# Patient Record
Sex: FEMALE | Race: OTHER | HISPANIC OR LATINO
[De-identification: names, ages, dates, MRNs, and addresses within clinical notes are randomized per-mention and may not be internally consistent; named-entity substitution may affect disease eponyms.]

---

## 2017-01-04 ENCOUNTER — RESULT REVIEW (OUTPATIENT)
Age: 47
End: 2017-01-04

## 2017-03-24 ENCOUNTER — APPOINTMENT (OUTPATIENT)
Dept: OTOLARYNGOLOGY | Facility: CLINIC | Age: 47
End: 2017-03-24

## 2017-03-24 VITALS
HEIGHT: 55 IN | DIASTOLIC BLOOD PRESSURE: 78 MMHG | HEART RATE: 96 BPM | BODY MASS INDEX: 34.71 KG/M2 | WEIGHT: 150 LBS | SYSTOLIC BLOOD PRESSURE: 119 MMHG

## 2017-03-24 RX ORDER — LEVOTHYROXINE SODIUM 0.1 MG/1
100 TABLET ORAL DAILY
Qty: 30 | Refills: 3 | Status: ACTIVE | COMMUNITY
Start: 2017-03-24 | End: 1900-01-01

## 2017-06-14 ENCOUNTER — OUTPATIENT (OUTPATIENT)
Dept: OUTPATIENT SERVICES | Facility: HOSPITAL | Age: 47
LOS: 1 days | End: 2017-06-14
Payer: COMMERCIAL

## 2017-06-14 VITALS
DIASTOLIC BLOOD PRESSURE: 70 MMHG | HEIGHT: 59 IN | WEIGHT: 156.09 LBS | TEMPERATURE: 98 F | HEART RATE: 96 BPM | RESPIRATION RATE: 16 BRPM | SYSTOLIC BLOOD PRESSURE: 110 MMHG

## 2017-06-14 DIAGNOSIS — Z98.890 OTHER SPECIFIED POSTPROCEDURAL STATES: Chronic | ICD-10-CM

## 2017-06-14 DIAGNOSIS — E72.3 DISORDERS OF LYSINE AND HYDROXYLYSINE METABOLISM: ICD-10-CM

## 2017-06-14 DIAGNOSIS — E03.9 HYPOTHYROIDISM, UNSPECIFIED: ICD-10-CM

## 2017-06-14 DIAGNOSIS — E11.9 TYPE 2 DIABETES MELLITUS WITHOUT COMPLICATIONS: ICD-10-CM

## 2017-06-14 DIAGNOSIS — Z98.891 HISTORY OF UTERINE SCAR FROM PREVIOUS SURGERY: Chronic | ICD-10-CM

## 2017-06-14 LAB
BUN SERPL-MCNC: 13 MG/DL — SIGNIFICANT CHANGE UP (ref 7–23)
CALCIUM SERPL-MCNC: 9.1 MG/DL — SIGNIFICANT CHANGE UP (ref 8.4–10.5)
CHLORIDE SERPL-SCNC: 100 MMOL/L — SIGNIFICANT CHANGE UP (ref 98–107)
CO2 SERPL-SCNC: 25 MMOL/L — SIGNIFICANT CHANGE UP (ref 22–31)
CREAT SERPL-MCNC: 0.57 MG/DL — SIGNIFICANT CHANGE UP (ref 0.5–1.3)
GLUCOSE SERPL-MCNC: 117 MG/DL — HIGH (ref 70–99)
HBA1C BLD-MCNC: 6.9 % — HIGH (ref 4–5.6)
HCT VFR BLD CALC: 38.9 % — SIGNIFICANT CHANGE UP (ref 34.5–45)
HGB BLD-MCNC: 11.4 G/DL — LOW (ref 11.5–15.5)
MCHC RBC-ENTMCNC: 23.4 PG — LOW (ref 27–34)
MCHC RBC-ENTMCNC: 29.3 % — LOW (ref 32–36)
MCV RBC AUTO: 79.9 FL — LOW (ref 80–100)
PLATELET # BLD AUTO: 303 K/UL — SIGNIFICANT CHANGE UP (ref 150–400)
PMV BLD: 9.4 FL — SIGNIFICANT CHANGE UP (ref 7–13)
POTASSIUM SERPL-MCNC: 4.2 MMOL/L — SIGNIFICANT CHANGE UP (ref 3.5–5.3)
POTASSIUM SERPL-SCNC: 4.2 MMOL/L — SIGNIFICANT CHANGE UP (ref 3.5–5.3)
RBC # BLD: 4.87 M/UL — SIGNIFICANT CHANGE UP (ref 3.8–5.2)
RBC # FLD: 14.8 % — HIGH (ref 10.3–14.5)
SODIUM SERPL-SCNC: 139 MMOL/L — SIGNIFICANT CHANGE UP (ref 135–145)
WBC # BLD: 8.51 K/UL — SIGNIFICANT CHANGE UP (ref 3.8–10.5)
WBC # FLD AUTO: 8.51 K/UL — SIGNIFICANT CHANGE UP (ref 3.8–10.5)

## 2017-06-14 PROCEDURE — 93010 ELECTROCARDIOGRAM REPORT: CPT

## 2017-06-14 RX ORDER — ASPIRIN/CALCIUM CARB/MAGNESIUM 324 MG
1 TABLET ORAL
Qty: 0 | Refills: 0 | COMMUNITY

## 2017-06-14 NOTE — H&P PST ADULT - RS GEN PE MLT RESP DETAILS PC
clear to auscultation bilaterally/airway patent/breath sounds equal/respirations non-labored/good air movement

## 2017-06-14 NOTE — H&P PST ADULT - ASSESSMENT
47 y/o female with pmhx hypothyroidism, HLD, DMT2, presents to PST for scheduled left excision of sebaceous cyst on 6/28/17. Patient reports having a mass to left neck that has grown over the last five years. Previous sx done to remove access tissue in [facility] however it has returned.

## 2017-06-14 NOTE — H&P PST ADULT - PROBLEM SELECTOR PLAN 1
Patient scheduled for left excision of sebaceous cyst on 6/28/17.   preop instructions given and explained, both step daughter and patient verbalized understanding.  urine container given for the day of surgery   medical clearance completed awaiting reports

## 2017-06-14 NOTE — H&P PST ADULT - MUSCULOSKELETAL
details… detailed exam no joint swelling/no joint erythema/no joint warmth/normal strength/ROM intact

## 2017-06-14 NOTE — H&P PST ADULT - NSANTHOSAYNRD_GEN_A_CORE
test completed 6/12/17 awaiting results/No. DEON screening performed.  STOP BANG Legend: 0-2 = LOW Risk; 3-4 = INTERMEDIATE Risk; 5-8 = HIGH Risk

## 2017-06-14 NOTE — H&P PST ADULT - HISTORY OF PRESENT ILLNESS
45 y/o female with pmhx hypothyroidism, HLD, DMT2, presents to PST for scheduled left excision of sebaceous cyst on 6/28/17. Patient reports having a mass to left neck that has grown over the last five years. Previous sx done to remove access tissue in [facility] however it has returned. Denies pain.

## 2017-06-14 NOTE — H&P PST ADULT - LYMPHATIC
posterior cervical R/supraclavicular L/anterior cervical R/supraclavicular R/posterior cervical L/anterior cervical L

## 2017-06-28 ENCOUNTER — OUTPATIENT (OUTPATIENT)
Dept: OUTPATIENT SERVICES | Facility: HOSPITAL | Age: 47
LOS: 1 days | Discharge: HOME | End: 2017-06-28
Payer: COMMERCIAL

## 2017-06-28 ENCOUNTER — APPOINTMENT (OUTPATIENT)
Dept: OTOLARYNGOLOGY | Facility: HOSPITAL | Age: 47
End: 2017-06-28

## 2017-06-28 ENCOUNTER — RESULT REVIEW (OUTPATIENT)
Age: 47
End: 2017-06-28

## 2017-06-28 VITALS
RESPIRATION RATE: 14 BRPM | HEIGHT: 59 IN | TEMPERATURE: 98 F | OXYGEN SATURATION: 98 % | WEIGHT: 156.09 LBS | DIASTOLIC BLOOD PRESSURE: 65 MMHG | SYSTOLIC BLOOD PRESSURE: 108 MMHG | HEART RATE: 85 BPM

## 2017-06-28 VITALS
OXYGEN SATURATION: 97 % | SYSTOLIC BLOOD PRESSURE: 117 MMHG | DIASTOLIC BLOOD PRESSURE: 72 MMHG | TEMPERATURE: 98 F | RESPIRATION RATE: 15 BRPM | HEART RATE: 89 BPM

## 2017-06-28 DIAGNOSIS — E72.3 DISORDERS OF LYSINE AND HYDROXYLYSINE METABOLISM: ICD-10-CM

## 2017-06-28 DIAGNOSIS — Z98.891 HISTORY OF UTERINE SCAR FROM PREVIOUS SURGERY: Chronic | ICD-10-CM

## 2017-06-28 DIAGNOSIS — Z98.890 OTHER SPECIFIED POSTPROCEDURAL STATES: Chronic | ICD-10-CM

## 2017-06-28 PROCEDURE — 21552 EXC NECK LES SC 3 CM/>: CPT | Mod: LT,GC

## 2017-06-28 PROCEDURE — 88304 TISSUE EXAM BY PATHOLOGIST: CPT | Mod: 26

## 2017-06-28 RX ORDER — SODIUM CHLORIDE 9 MG/ML
1000 INJECTION, SOLUTION INTRAVENOUS
Qty: 0 | Refills: 0 | Status: DISCONTINUED | OUTPATIENT
Start: 2017-06-28 | End: 2017-06-29

## 2017-06-28 RX ORDER — ASPIRIN/CALCIUM CARB/MAGNESIUM 324 MG
1 TABLET ORAL
Qty: 0 | Refills: 0 | COMMUNITY

## 2017-06-28 RX ORDER — ATORVASTATIN CALCIUM 80 MG/1
1 TABLET, FILM COATED ORAL
Qty: 0 | Refills: 0 | COMMUNITY

## 2017-06-28 RX ORDER — METFORMIN HYDROCHLORIDE 850 MG/1
1 TABLET ORAL
Qty: 0 | Refills: 0 | COMMUNITY

## 2017-06-28 RX ORDER — LEVOTHYROXINE SODIUM 125 MCG
1 TABLET ORAL
Qty: 0 | Refills: 0 | COMMUNITY

## 2017-06-28 RX ORDER — OXYCODONE HYDROCHLORIDE 5 MG/1
1 TABLET ORAL
Qty: 20 | Refills: 0 | OUTPATIENT
Start: 2017-06-28

## 2017-06-28 RX ORDER — FOLIC ACID 0.8 MG
1 TABLET ORAL
Qty: 0 | Refills: 0 | COMMUNITY

## 2017-06-28 NOTE — ASU DISCHARGE PLAN (ADULT/PEDIATRIC). - MEDICATION SUMMARY - MEDICATIONS TO TAKE
I will START or STAY ON the medications listed below when I get home from the hospital:    acetaminophen-oxycodone 325 mg-5 mg oral tablet  -- 1 tab(s) by mouth every 4 hours, As needed, Mild Pain (1 - 3) MDD:6  -- Indication: For Disorder of lysine and hydroxylysine metabolism    Aspirin Low Dose 81 mg oral delayed release tablet  -- 1 tab(s) by mouth once a day (at bedtime) - last dose - 6/21/2017  -- Indication: For Disorder of lysine and hydroxylysine metabolism    metFORMIN 1000 mg oral tablet  -- 1 tab(s) by mouth 2 times a day  -- Indication: For Disorder of lysine and hydroxylysine metabolism    atorvastatin 10 mg oral tablet  -- 1 tab(s) by mouth once a day (at bedtime)  -- Indication: For Disorder of lysine and hydroxylysine metabolism    levothyroxine 100 mcg (0.1 mg) oral tablet  -- 1 tab(s) by mouth once a day in morning  -- Indication: For Disorder of lysine and hydroxylysine metabolism    folic acid 1 mg oral tablet  -- 1 tab(s) by mouth once a day (at bedtime)  -- Indication: For Disorder of lysine and hydroxylysine metabolism

## 2017-06-28 NOTE — ASU DISCHARGE PLAN (ADULT/PEDIATRIC). - NURSING INSTRUCTIONS
You were given 1000mg IV Tylenol for pain management.  Please DO NOT take any Tylenol containing products, such as  Vicodin, Percocet, Excedrin, many cold preparations for the next 8 hours (until 845p ).  DO NOT EXCEED 3000MG OF TYLENOL OVER 24 HOURS.

## 2017-06-28 NOTE — ASU DISCHARGE PLAN (ADULT/PEDIATRIC). - NOTIFY
Swelling that continues/Bleeding that does not stop Fever greater than 101/Swelling that continues/Pain not relieved by Medications/Bleeding that does not stop

## 2017-06-29 ENCOUNTER — TRANSCRIPTION ENCOUNTER (OUTPATIENT)
Age: 47
End: 2017-06-29

## 2017-07-03 LAB — SURGICAL PATHOLOGY STUDY: SIGNIFICANT CHANGE UP

## 2017-07-06 ENCOUNTER — APPOINTMENT (OUTPATIENT)
Dept: OTOLARYNGOLOGY | Facility: CLINIC | Age: 47
End: 2017-07-06

## 2017-07-06 DIAGNOSIS — L72.0 EPIDERMAL CYST: ICD-10-CM

## 2017-07-11 ENCOUNTER — TRANSCRIPTION ENCOUNTER (OUTPATIENT)
Age: 47
End: 2017-07-11

## 2017-09-05 ENCOUNTER — APPOINTMENT (OUTPATIENT)
Dept: OTOLARYNGOLOGY | Facility: CLINIC | Age: 47
End: 2017-09-05

## 2020-03-24 ENCOUNTER — EMERGENCY (EMERGENCY)
Facility: HOSPITAL | Age: 50
LOS: 0 days | Discharge: HOME | End: 2020-03-24
Payer: COMMERCIAL

## 2020-03-24 VITALS
WEIGHT: 149.03 LBS | DIASTOLIC BLOOD PRESSURE: 81 MMHG | TEMPERATURE: 101 F | HEIGHT: 64 IN | SYSTOLIC BLOOD PRESSURE: 117 MMHG | RESPIRATION RATE: 20 BRPM | HEART RATE: 107 BPM

## 2020-03-24 DIAGNOSIS — Z98.890 OTHER SPECIFIED POSTPROCEDURAL STATES: Chronic | ICD-10-CM

## 2020-03-24 DIAGNOSIS — Z98.891 HISTORY OF UTERINE SCAR FROM PREVIOUS SURGERY: Chronic | ICD-10-CM

## 2020-03-24 PROCEDURE — 99283 EMERGENCY DEPT VISIT LOW MDM: CPT

## 2020-03-24 RX ORDER — ACETAMINOPHEN 500 MG
975 TABLET ORAL ONCE
Refills: 0 | Status: COMPLETED | OUTPATIENT
Start: 2020-03-24 | End: 2020-03-24

## 2020-03-24 RX ADMIN — Medication 975 MILLIGRAM(S): at 17:12

## 2020-03-24 NOTE — ED PROVIDER NOTE - PATIENT PORTAL LINK FT
You can access the FollowMyHealth Patient Portal offered by [facility] by registering at the following website: [email or web address]. By joining [facility]’s FollowMyHealth portal, you will also be able to view your health information using other applications (apps) compatible with our system.

## 2020-03-24 NOTE — ED PROVIDER NOTE - OBJECTIVE STATEMENT
Pt is a 49 year old female with PMH HTN presents to ED c/o fever. Pt states for past (4) days has had fever (tmax 102.5) treating with tylenol, cough (non productive), HA, sore throat. Pt attests to multiple sick contacts at work, finding out that there were COVID pos contacts. Pt denies any chest pain, sob, abdominal pain, NVCD.

## 2020-03-24 NOTE — ED PROVIDER NOTE - NSFOLLOWUPINSTRUCTIONS_ED_ALL_ED_FT
Follow up with your primary medical doctor, as well as social isolate yourself for 14 days, secondary to likely having COVID    Novel Coronavirus (COVID-19)  The Facts  What is a coronavirus?  Coronaviruses are a large family of viruses that cause illnesses ranging from the common cold  to more severe diseases such as Middle East Respiratory Syndrome (MERS) and Severe Acute  Respiratory Syndrome (SARS).  What is Novel Coronavirus (COVID-19)?  COVID-19 is a new strain of Coronavirus that has not been previously identified in humans. COVID-19  was identified in [place], [place], [place] in December 2019 (COVID-19). COVID-19 has  since been identified outside of [place], in a growing number of countries internationally, including  the [place].  Where can I find the most recent information about COVID-19?  The Centers for Disease Control and Prevention (CDC) is closely monitoring the outbreak caused by the  COVID-19. For the latest information about COVID- 19, visit the CDC website at  https://www.cdc.gov/coronavirus/index.html  How are coronaviruses spread?  Coronaviruses can be transmitted from person-to- person, usually after close contact with an infected person,  for example, in a household, workplace, or healthcare setting via droplets that become airborne after a cough  or sneeze by an affected person. These droplets can then infect a nearby person. It is likely transmission also  occurs by touching recently contaminated surfaces.  What are the symptoms of coronavirus infection?  It depends on the virus, but common signs include fever and/or respiratory symptoms such as  cough and shortness of breath. In more severe cases, infection can cause pneumonia, severe acute  respiratory syndrome, kidney failure and even death. Fortunately, most cases of COVID-19 have an  illness no different than the influenza “flu”. With a majority of these patients having mild symptoms  and overall mortality which appears to be not much different than the flu.  Is there a treatment for a COVID-19?  There is no specific treatment for disease caused by COVID-19. However, many of the symptoms can  be treated based on the patient’s clinical condition. Supportive care for infected persons can be highly  effective.  What can I do to protect myself?  Washing your hands, covering your cough, and disinfecting surfaces are the best precautionary  measures. It is also advisable to avoid close contact with anyone showing symptoms of respiratory  illness such as coughing and sneezing. Those with symptoms should wear a surgical mask when  around others.  What can I do to protect those around me?  If you have been identified as someone who may be infected with COVID-19, we recommend you  follow the self-isolation procedures outlined below to protect those around you and limit the spread  of this virus.   March 3, 2020  Recommendations for Patients Advised to Self-Isolate  for Possible COVID-19 Exposure  We recommend the below precautionary steps from now until 14 days from when you  returned from your travel or date of your last known possible contact:  - Do not go to work, school, or public areas. Avoid using public transportation, ride-sharing, or  taxis.  - As much as possible, separate yourself from other people in your home. If you can, you should  stay in a room and away from other people in your home. Also, you should use a separate  bathroom, if available.  - Wear the supplied mask whenever you are around other people.  - If you have a non-urgent medical appointment, please reschedule for a later date. If the  appointment is urgent, please call the healthcare provider and tell them that you are on selfisolation for possible COVID-19. This will help the healthcare provider’s office take steps to keep  other people from getting infected or exposed. If you can reschedule routine appointments, do  so.  - Wash your hands often with soap and water for at least 15 to 20 seconds or clean your hands  with an alcohol-based hand  that contains 60 to 95% alcohol, covering all surfaces of  your hands and rubbing them together until they feel dry. Soap and water should be used  preferentially if hands are visibly dirty.  - Cover your mouth and nose with a tissue when you cough or sneeze. Throw used tissues in a  lined trash can; immediately wash your hands.  - Avoid touching your eyes, nose, and mouth with your hands.  - Avoid sharing personal household items. You should not share dishes, drinking glasses, cups,  eating utensils, towels, or bedding with other people or pets in your home. After using these  items, they should be washed thoroughly with soap and water.  - Clean and disinfect all “high-touch” surfaces every day. High touch surfaces include counters,  tabletops, doorknobs, light switches, remote controls, bathroom fixtures, toilets, phones,  keyboards, tablets, and bedside tables. Also, clean any surfaces that may have blood, stool, or  body fluids on them.       If you develop worsening symptoms:  - If you develop worsening symptoms, such as severe shortness of breath, please call [phone] option [phone]. They will assist you in determining your next steps.  During your time on self-isolation do the following:  - Work from home if you are able to so.  - Limit social isolation by talking with friends and family on the phone or with face-time  - Talk with friends and relatives who don’t live with you about supporting each other if one  household has to be quarantined. For example, agree to drop groceries or other supplies at the  front door.  - Exercise and spend time outdoors away from others if able to do so.    Why didn’t I get tested for novel coronavirus (COVID-19)?  The number of available tests is very limited so strict rules exist for who is allowed to be tested.  [facility] has been authorized to perform testing and is currently working hard to be  able to start providing the test. Such testing is currently reserved for patients who have had  contact with someone infected with the virus, or those who are very sick a plus those who have  traveled to areas identified by the Centers for Disease Control and Prevention (CD) and will  require hospitalization.  What should I do now?  If you are well enough to be discharged home and are not in a high risk group to have  contracted the COVID-10, you should care for yourself at home exactly like you would if you  have Influenza “flu”. Follow all the standard guidelines about washing your hands, covering  your cough, etc.  You should return to the Emergency Department if you develop worse symptoms, trouble  breathing, chest pain, and/or a fever that doesn’t improve with over the counter  acetaminophen or ibuprofen.

## 2020-03-24 NOTE — ED PROVIDER NOTE - CLINICAL SUMMARY MEDICAL DECISION MAKING FREE TEXT BOX
49 female presents with 4d of fever (T max 102), chills, bodyaches, HA and non productive cough. 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 Physical exam reveals no abnormalities. Pt advised to continue with motrin, tylenol and supportive care and isolate for 14 days

## 2020-03-24 NOTE — ED PROVIDER NOTE - NS ED ROS FT
Constitutional: (+) fever (see HPI)  Eyes/ENT: (-) blurry vision, (-) epistaxis  Cardiovascular: (-) chest pain, (-) syncope  Respiratory: (+) cough, (-) shortness of breath (See HPI)  Gastrointestinal: (-) vomiting, (-) diarrhea  Musculoskeletal: (-) neck pain, (-) back pain, (-) joint pain  Integumentary: (-) rash, (-) edema  Neurological: (+) headache, (-) altered mental status (see HPI)  Psychiatric: (-) hallucinations  Allergic/Immunologic: (-) pruritus

## 2020-03-25 DIAGNOSIS — R51 HEADACHE: ICD-10-CM

## 2020-03-25 DIAGNOSIS — R50.9 FEVER, UNSPECIFIED: ICD-10-CM

## 2020-03-25 DIAGNOSIS — R05 COUGH: ICD-10-CM

## 2020-03-25 DIAGNOSIS — B34.9 VIRAL INFECTION, UNSPECIFIED: ICD-10-CM

## 2020-03-25 PROBLEM — E78.5 HYPERLIPIDEMIA, UNSPECIFIED: Chronic | Status: ACTIVE | Noted: 2017-06-14

## 2020-03-25 PROBLEM — E11.9 TYPE 2 DIABETES MELLITUS WITHOUT COMPLICATIONS: Chronic | Status: ACTIVE | Noted: 2017-06-14

## 2020-03-25 PROBLEM — E72.3: Chronic | Status: ACTIVE | Noted: 2017-06-14

## 2020-03-25 PROBLEM — E03.9 HYPOTHYROIDISM, UNSPECIFIED: Chronic | Status: ACTIVE | Noted: 2017-06-14

## 2021-03-29 NOTE — ED PROVIDER NOTE - NSTIMEPROVIDERCAREINITIATE_GEN_ER
Called patient's daughter-in-law Cherokee Staff to give her an update on patient's status  I was able to connect with Dilcia Mario, updated her and answered her questions  Meghna expressed understanding and gratitude for the call  24-Mar-2020 16:34

## 2021-06-13 NOTE — ED PROVIDER NOTE - MDM PATIENT STATEMENT FOR ADDL TREATMENT
Patients heparin is running at 15 units/kg/hr.   Patient with one or more new problems requiring additional work-up/treatment.

## 2024-08-21 NOTE — H&P PST ADULT - INTERPRETATION SERVICES DECLINED
Upper GI Endoscopy: Before Your Procedure    What is an upper GI endoscopy? An upper gastrointestinal (or GI) endoscopy is a test that allows your doctor to look at the inside of your esophagus, stomach, and the first part of your small intestine, called the duodenum. The esophagus is the tube that carries food to your stomach. The doctor uses a thin, lighted tube that bends. It is called an endoscope, or scope. The doctor puts the tip of the scope in your mouth and gently moves it down your throat. The scope is a flexible video camera. The doctor looks at a monitor (like a TV set or a computer screen) as he or she moves the scope. A doctor may do this procedure to look for ulcers, tumors, infection, or bleeding. It also can be used to look for signs of acid backing up into your esophagus. This is called gastroesophageal reflux disease, or GERD. The doctor can use the scope to take a sample of tissue for study (a biopsy). The doctor also can use the scope to take out growths or stop bleeding. How do you prepare for the procedure? Procedures can be stressful. This information will help you understand what you can expect. And it will help you safely prepare for your procedure. Preparing for the procedure    Do not eat or drink anything for 6 to 8 hours before the test. An empty stomach helps your doctor see your stomach clearly during the test. It also reduces your chances of vomiting. If you vomit, there is a small risk that the vomit could enter your lungs. (This is called aspiration.) If the test is done in an emergency, a tube may be inserted through your nose or mouth to empty your stomach. Do not take sucralfate (Carafate) or antacids on the day of the test. These medicines can make it hard for your doctor to see your upper GI tract. If your doctor tells you to, stop taking iron supplements 7 to 14 days before the test.     Be sure you have someone to take you home.  Anesthesia and pain medicine will
Vss, tan po fluids, denies pain, ambulates easily. IV removed, catheter intact. Discharge instructions provided and states understanding. States ready to go home.  Discharged from facility with family per wheelchair.   
Patient/Caregiver requests family/friend to interpret./Step daughter aure

## 2024-11-14 NOTE — H&P PST ADULT - GENITOURINARY
DATE OF SERVICE: 11-14-24 @ 13:27    Patient is a 72y old  Female who presents with a chief complaint of Symptomatic anemia, Syncope (14 Nov 2024 09:52)      INTERVAL HISTORY: Feels ok.     REVIEW OF SYSTEMS:  CONSTITUTIONAL: No weakness  EYES/ENT: No visual changes;  No throat pain   NECK: No pain or stiffness  RESPIRATORY: No cough, wheezing; No shortness of breath  CARDIOVASCULAR: No chest pain or palpitations  GASTROINTESTINAL: No abdominal  pain. No nausea, vomiting, or hematemesis  GENITOURINARY: No dysuria, frequency or hematuria  NEUROLOGICAL: No stroke like symptoms  SKIN: No rashes    TELEMETRY Personally reviewed: SR with brief PAT  	  MEDICATIONS:        PHYSICAL EXAM:  T(C): 36.9 (11-14-24 @ 11:19), Max: 36.9 (11-14-24 @ 11:19)  HR: 82 (11-14-24 @ 11:19) (79 - 82)  BP: 125/65 (11-14-24 @ 11:19) (124/65 - 125/65)  RR: 18 (11-14-24 @ 11:19) (18 - 18)  SpO2: 97% (11-14-24 @ 11:19) (96% - 97%)  Wt(kg): --  I&O's Summary    13 Nov 2024 07:01  -  14 Nov 2024 07:00  --------------------------------------------------------  IN: 1680 mL / OUT: 3150 mL / NET: -1470 mL    14 Nov 2024 07:01  -  14 Nov 2024 13:27  --------------------------------------------------------  IN: 720 mL / OUT: 900 mL / NET: -180 mL          Appearance: In no distress	  HEENT:    PERRL, EOMI	  Cardiovascular:  S1 S2, No JVD  Respiratory: Lungs clear to auscultation	  Gastrointestinal:  Soft, Non-tender, + BS	  Vascularature:  No edema of LE  Psychiatric: Appropriate affect   Neuro: no acute focal deficits                               7.1    7.12  )-----------( 350      ( 14 Nov 2024 06:23 )             24.7     11-14    137  |  100  |  37[H]  ----------------------------<  146[H]  4.2   |  25  |  0.76    Ca    8.7      14 Nov 2024 06:26  Phos  3.6     11-14  Mg     2.1     11-14          Labs personally reviewed      ASSESSMENT/PLAN: 	  72F w/Hx of chronic anemia, bullous pemphigoid on IVIG, HTN, HLD, DM, hypothyroidism, chronic back pain presents after syncopal episode. 1 minute of CPR was initiated in the field by EMS because there was concern of pulseness with unresponsiveness. Patient was hypotensive upon arrival and found to have a hgb of 5.1. Patient reports she syncopized after receiving IVIG. Reports she had been feeling lightheaded and SOB for a few days prior to this event. Has had bout of anemia in the past treated with iron supplementation, folate and B12. She denies any hematuria, melena, gingival bleeding. She reports significant improvement of symptoms since receiving 2u of pRBCs. Her only current complaint is acute on chronic back pain from her stretcher. Patient denies fever, chills, chest pain, SOB, headache, dizziness, abd pain, nausea, vomiting, constipation, dysuria.      Problem/Plan #1:  Problem: Syncope   - Trop 26 --> 28  - EKG ischemic but likely in setting of Hb of 5.1  - POCUS noted no pericardial effusion, globally reduced LV function   - Echo with reduced EF & severe aortic stenosis  - Replete Hgb > 7   - s/p cath 11/5 with mild disease only  - s/p TAVR 11/11  - Repeat TTE 11/12 with valve well seated    Problem/Plan #2:  Problem: Hypertension  - stable on sHF GDMT    Problem/Plan #3:  Problem: HLD  - c/w atorvastatin 20mg PO daily    Problem/Plan #4:  Problem: Cardiac Risk Stratification  - Symptomatic anemia  - Echo with reduced EF noted & severe aortic stenosis  - High risk for low risk nonelective EGD/Colonoscopy 2/2 systolic HF and severe aortic stenosis  - s/p egd/ colonoscopy today 11/4, no source of bleed identified    Problem/Plan #5:  Problem: Systolic HF  - TTE 10/30 with EF 25-30%  - Repeat TTE with EF wnl, elevated filling pressures  - Spironolactone 25mg daily DC'd given hyerkalemia  - Metoprolol and Entreso held 2/2 soft BP       Problem/Plan #6:  - Problem: Severe Aortic Stenosis  - Structural Dr. Greene consulted   - s/p TAVR 11/11    Problem/Plan #7:  - Problem: Acute right LE DVT  - heparin gtt as per primary team  - Plan for transition to Eliquis 5mg PO BID for DC          Joann Teague, AG-NP   Adama Rollins DO [facility]  Cardiovascular Medicine  [address] [place] [address], [address]  Available through call or text on [employer]  Office: [phone]   DATE OF SERVICE: 11-14-24 @ 13:27    Patient is a 72y old  Female who presents with a chief complaint of Symptomatic anemia, Syncope (14 Nov 2024 09:52)      INTERVAL HISTORY: Feels ok.     REVIEW OF SYSTEMS:  CONSTITUTIONAL: No weakness  EYES/ENT: No visual changes;  No throat pain   NECK: No pain or stiffness  RESPIRATORY: No cough, wheezing; No shortness of breath  CARDIOVASCULAR: No chest pain or palpitations  GASTROINTESTINAL: No abdominal  pain. No nausea, vomiting, or hematemesis  GENITOURINARY: No dysuria, frequency or hematuria  NEUROLOGICAL: No stroke like symptoms  SKIN: No rashes    TELEMETRY Personally reviewed: SR with brief PAT  	  MEDICATIONS:        PHYSICAL EXAM:  T(C): 36.9 (11-14-24 @ 11:19), Max: 36.9 (11-14-24 @ 11:19)  HR: 82 (11-14-24 @ 11:19) (79 - 82)  BP: 125/65 (11-14-24 @ 11:19) (124/65 - 125/65)  RR: 18 (11-14-24 @ 11:19) (18 - 18)  SpO2: 97% (11-14-24 @ 11:19) (96% - 97%)  Wt(kg): --  I&O's Summary    13 Nov 2024 07:01  -  14 Nov 2024 07:00  --------------------------------------------------------  IN: 1680 mL / OUT: 3150 mL / NET: -1470 mL    14 Nov 2024 07:01  -  14 Nov 2024 13:27  --------------------------------------------------------  IN: 720 mL / OUT: 900 mL / NET: -180 mL          Appearance: In no distress	  HEENT:    PERRL, EOMI	  Cardiovascular:  S1 S2, No JVD  Respiratory: Lungs clear to auscultation	  Gastrointestinal:  Soft, Non-tender, + BS	  Vascularature:  No edema of LE  Psychiatric: Appropriate affect   Neuro: no acute focal deficits                               7.1    7.12  )-----------( 350      ( 14 Nov 2024 06:23 )             24.7     11-14    137  |  100  |  37[H]  ----------------------------<  146[H]  4.2   |  25  |  0.76    Ca    8.7      14 Nov 2024 06:26  Phos  3.6     11-14  Mg     2.1     11-14          Labs personally reviewed      ASSESSMENT/PLAN: 	  72F w/Hx of chronic anemia, bullous pemphigoid on IVIG, HTN, HLD, DM, hypothyroidism, chronic back pain presents after syncopal episode. 1 minute of CPR was initiated in the field by EMS because there was concern of pulseness with unresponsiveness. Patient was hypotensive upon arrival and found to have a hgb of 5.1. Patient reports she syncopized after receiving IVIG. Reports she had been feeling lightheaded and SOB for a few days prior to this event. Has had bout of anemia in the past treated with iron supplementation, folate and B12. She denies any hematuria, melena, gingival bleeding. She reports significant improvement of symptoms since receiving 2u of pRBCs. Her only current complaint is acute on chronic back pain from her stretcher. Patient denies fever, chills, chest pain, SOB, headache, dizziness, abd pain, nausea, vomiting, constipation, dysuria.      Problem/Plan #1:  Problem: Syncope   - Trop 26 --> 28  - EKG ischemic but likely in setting of Hb of 5.1  - POCUS noted no pericardial effusion, globally reduced LV function   - Echo with reduced EF & severe aortic stenosis  - Replete Hgb > 7   - s/p cath 11/5 with mild disease only  - s/p TAVR 11/11  - Repeat TTE 11/12 with valve well seated    Problem/Plan #2:  Problem: Hypertension  - stable on sHF GDMT    Problem/Plan #3:  Problem: Anemia  - Discussed with CTS NP Lana Ross and Dr Onofre recommendation for inpt heme consult prior to d/c     Problem/Plan #4:  Problem: Cardiac Risk Stratification  - Symptomatic anemia  - Echo with reduced EF noted & severe aortic stenosis  - High risk for low risk nonelective EGD/Colonoscopy 2/2 systolic HF and severe aortic stenosis  - s/p egd/ colonoscopy today 11/4, no source of bleed identified    Problem/Plan #5:  Problem: Systolic HF  - TTE 10/30 with EF 25-30%  - Repeat TTE with EF wnl, elevated filling pressures  - Spironolactone 25mg daily DC'd given hyerkalemia  - Metoprolol and Entreso held 2/2 soft BP and normalization of EF       Problem/Plan #6:  - Problem: Severe Aortic Stenosis  - Structural Dr. Greene consulted   - s/p TAVR 11/11    Problem/Plan #7:  - Problem: Acute right LE DVT  - heparin gtt as per primary team  - Plan for transition to Eliquis 5mg PO BID for DC          Joann Teague, AG-NP   Adama Rollins DO [facility]  Cardiovascular Medicine  [address] [place] [address], [address]  Available through call or text on [employer] TE[employer]  Office: [phone]   details…

## 2024-12-28 ENCOUNTER — EMERGENCY (EMERGENCY)
Facility: HOSPITAL | Age: 54
LOS: 0 days | Discharge: HOME | End: 2024-12-28
Attending: EMERGENCY MEDICINE
Payer: COMMERCIAL

## 2024-12-28 VITALS
OXYGEN SATURATION: 100 % | RESPIRATION RATE: 20 BRPM | WEIGHT: 154.98 LBS | HEIGHT: 55 IN | DIASTOLIC BLOOD PRESSURE: 75 MMHG | TEMPERATURE: 98 F | HEART RATE: 80 BPM | SYSTOLIC BLOOD PRESSURE: 123 MMHG

## 2024-12-28 VITALS
HEART RATE: 82 BPM | DIASTOLIC BLOOD PRESSURE: 78 MMHG | RESPIRATION RATE: 16 BRPM | SYSTOLIC BLOOD PRESSURE: 121 MMHG | OXYGEN SATURATION: 98 %

## 2024-12-28 DIAGNOSIS — E78.5 HYPERLIPIDEMIA, UNSPECIFIED: ICD-10-CM

## 2024-12-28 DIAGNOSIS — M79.89 OTHER SPECIFIED SOFT TISSUE DISORDERS: ICD-10-CM

## 2024-12-28 DIAGNOSIS — M54.50 LOW BACK PAIN, UNSPECIFIED: ICD-10-CM

## 2024-12-28 DIAGNOSIS — Z98.890 OTHER SPECIFIED POSTPROCEDURAL STATES: Chronic | ICD-10-CM

## 2024-12-28 DIAGNOSIS — R60.0 LOCALIZED EDEMA: ICD-10-CM

## 2024-12-28 DIAGNOSIS — Z98.891 HISTORY OF UTERINE SCAR FROM PREVIOUS SURGERY: Chronic | ICD-10-CM

## 2024-12-28 DIAGNOSIS — E03.9 HYPOTHYROIDISM, UNSPECIFIED: ICD-10-CM

## 2024-12-28 DIAGNOSIS — Z79.84 LONG TERM (CURRENT) USE OF ORAL HYPOGLYCEMIC DRUGS: ICD-10-CM

## 2024-12-28 DIAGNOSIS — Z79.82 LONG TERM (CURRENT) USE OF ASPIRIN: ICD-10-CM

## 2024-12-28 DIAGNOSIS — E11.9 TYPE 2 DIABETES MELLITUS WITHOUT COMPLICATIONS: ICD-10-CM

## 2024-12-28 LAB
ALBUMIN SERPL ELPH-MCNC: 3.3 G/DL — SIGNIFICANT CHANGE UP (ref 3.3–5)
ALP SERPL-CCNC: 58 U/L — SIGNIFICANT CHANGE UP (ref 40–120)
ALT FLD-CCNC: 23 U/L — SIGNIFICANT CHANGE UP (ref 12–78)
ANION GAP SERPL CALC-SCNC: 6 MMOL/L — SIGNIFICANT CHANGE UP (ref 5–17)
APPEARANCE UR: CLEAR — SIGNIFICANT CHANGE UP
AST SERPL-CCNC: 28 U/L — SIGNIFICANT CHANGE UP (ref 15–37)
BASOPHILS # BLD AUTO: 0.04 K/UL — SIGNIFICANT CHANGE UP (ref 0–0.2)
BASOPHILS NFR BLD AUTO: 0.5 % — SIGNIFICANT CHANGE UP (ref 0–2)
BILIRUB SERPL-MCNC: 0.4 MG/DL — SIGNIFICANT CHANGE UP (ref 0.2–1.2)
BILIRUB UR-MCNC: NEGATIVE — SIGNIFICANT CHANGE UP
BUN SERPL-MCNC: 11 MG/DL — SIGNIFICANT CHANGE UP (ref 7–23)
CALCIUM SERPL-MCNC: 8.9 MG/DL — SIGNIFICANT CHANGE UP (ref 8.5–10.1)
CHLORIDE SERPL-SCNC: 107 MMOL/L — SIGNIFICANT CHANGE UP (ref 96–108)
CO2 SERPL-SCNC: 27 MMOL/L — SIGNIFICANT CHANGE UP (ref 22–31)
COLOR SPEC: YELLOW — SIGNIFICANT CHANGE UP
CREAT SERPL-MCNC: 0.5 MG/DL — SIGNIFICANT CHANGE UP (ref 0.5–1.3)
DIFF PNL FLD: NEGATIVE — SIGNIFICANT CHANGE UP
EGFR: 111 ML/MIN/1.73M2 — SIGNIFICANT CHANGE UP
EGFR: 111 ML/MIN/1.73M2 — SIGNIFICANT CHANGE UP
EOSINOPHIL # BLD AUTO: 0.17 K/UL — SIGNIFICANT CHANGE UP (ref 0–0.5)
EOSINOPHIL NFR BLD AUTO: 2.1 % — SIGNIFICANT CHANGE UP (ref 0–6)
GLUCOSE SERPL-MCNC: 91 MG/DL — SIGNIFICANT CHANGE UP (ref 70–99)
GLUCOSE UR QL: NEGATIVE MG/DL — SIGNIFICANT CHANGE UP
HCT VFR BLD CALC: 40 % — SIGNIFICANT CHANGE UP (ref 34.5–45)
HGB BLD-MCNC: 13.1 G/DL — SIGNIFICANT CHANGE UP (ref 11.5–15.5)
IMM GRANULOCYTES NFR BLD AUTO: 0.4 % — SIGNIFICANT CHANGE UP (ref 0–0.9)
KETONES UR-MCNC: NEGATIVE MG/DL — SIGNIFICANT CHANGE UP
LEUKOCYTE ESTERASE UR-ACNC: NEGATIVE — SIGNIFICANT CHANGE UP
LYMPHOCYTES # BLD AUTO: 1.9 K/UL — SIGNIFICANT CHANGE UP (ref 1–3.3)
LYMPHOCYTES # BLD AUTO: 23.5 % — SIGNIFICANT CHANGE UP (ref 13–44)
MAGNESIUM SERPL-MCNC: 2.1 MG/DL — SIGNIFICANT CHANGE UP (ref 1.6–2.6)
MCHC RBC-ENTMCNC: 28.1 PG — SIGNIFICANT CHANGE UP (ref 27–34)
MCHC RBC-ENTMCNC: 32.8 G/DL — SIGNIFICANT CHANGE UP (ref 32–36)
MCV RBC AUTO: 85.7 FL — SIGNIFICANT CHANGE UP (ref 80–100)
MONOCYTES # BLD AUTO: 0.55 K/UL — SIGNIFICANT CHANGE UP (ref 0–0.9)
MONOCYTES NFR BLD AUTO: 6.8 % — SIGNIFICANT CHANGE UP (ref 2–14)
NEUTROPHILS # BLD AUTO: 5.39 K/UL — SIGNIFICANT CHANGE UP (ref 1.8–7.4)
NEUTROPHILS NFR BLD AUTO: 66.7 % — SIGNIFICANT CHANGE UP (ref 43–77)
NITRITE UR-MCNC: NEGATIVE — SIGNIFICANT CHANGE UP
NRBC # BLD: 0 /100 WBCS — SIGNIFICANT CHANGE UP (ref 0–0)
NRBC BLD-RTO: 0 /100 WBCS — SIGNIFICANT CHANGE UP (ref 0–0)
NT-PROBNP SERPL-SCNC: 196 PG/ML — HIGH (ref 0–125)
PH UR: 7.5 — SIGNIFICANT CHANGE UP (ref 5–8)
PLATELET # BLD AUTO: 321 K/UL — SIGNIFICANT CHANGE UP (ref 150–400)
POTASSIUM SERPL-MCNC: 4.5 MMOL/L — SIGNIFICANT CHANGE UP (ref 3.5–5.3)
POTASSIUM SERPL-SCNC: 4.5 MMOL/L — SIGNIFICANT CHANGE UP (ref 3.5–5.3)
PROT SERPL-MCNC: 7.2 GM/DL — SIGNIFICANT CHANGE UP (ref 6–8.3)
PROT UR-MCNC: NEGATIVE MG/DL — SIGNIFICANT CHANGE UP
RBC # BLD: 4.67 M/UL — SIGNIFICANT CHANGE UP (ref 3.8–5.2)
RBC # FLD: 13.4 % — SIGNIFICANT CHANGE UP (ref 10.3–14.5)
SODIUM SERPL-SCNC: 140 MMOL/L — SIGNIFICANT CHANGE UP (ref 135–145)
SP GR SPEC: 1.01 — SIGNIFICANT CHANGE UP (ref 1–1.03)
TROPONIN I, HIGH SENSITIVITY RESULT: 6.2 NG/L — SIGNIFICANT CHANGE UP
TSH SERPL-MCNC: 0.45 UIU/ML — SIGNIFICANT CHANGE UP (ref 0.36–3.74)
UROBILINOGEN FLD QL: 0.2 MG/DL — SIGNIFICANT CHANGE UP (ref 0.2–1)
WBC # BLD: 8.08 K/UL — SIGNIFICANT CHANGE UP (ref 3.8–10.5)
WBC # FLD AUTO: 8.08 K/UL — SIGNIFICANT CHANGE UP (ref 3.8–10.5)

## 2024-12-28 PROCEDURE — 93010 ELECTROCARDIOGRAM REPORT: CPT

## 2024-12-28 PROCEDURE — 99285 EMERGENCY DEPT VISIT HI MDM: CPT

## 2024-12-28 PROCEDURE — 71046 X-RAY EXAM CHEST 2 VIEWS: CPT | Mod: 26

## 2025-07-15 ENCOUNTER — EMERGENCY (EMERGENCY)
Facility: HOSPITAL | Age: 55
LOS: 0 days | Discharge: HOME | End: 2025-07-16
Attending: STUDENT IN AN ORGANIZED HEALTH CARE EDUCATION/TRAINING PROGRAM
Payer: COMMERCIAL

## 2025-07-15 VITALS
HEIGHT: 64 IN | RESPIRATION RATE: 20 BRPM | TEMPERATURE: 99 F | SYSTOLIC BLOOD PRESSURE: 150 MMHG | WEIGHT: 149.91 LBS | DIASTOLIC BLOOD PRESSURE: 87 MMHG | OXYGEN SATURATION: 98 % | HEART RATE: 75 BPM

## 2025-07-15 DIAGNOSIS — R07.0 PAIN IN THROAT: ICD-10-CM

## 2025-07-15 DIAGNOSIS — E11.9 TYPE 2 DIABETES MELLITUS WITHOUT COMPLICATIONS: ICD-10-CM

## 2025-07-15 DIAGNOSIS — S09.90XA UNSPECIFIED INJURY OF HEAD, INITIAL ENCOUNTER: ICD-10-CM

## 2025-07-15 DIAGNOSIS — Z98.891 HISTORY OF UTERINE SCAR FROM PREVIOUS SURGERY: Chronic | ICD-10-CM

## 2025-07-15 DIAGNOSIS — E03.9 HYPOTHYROIDISM, UNSPECIFIED: ICD-10-CM
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
## 2025-07-15 DIAGNOSIS — Z98.890 OTHER SPECIFIED POSTPROCEDURAL STATES: Chronic | ICD-10-CM

## 2025-07-15 DIAGNOSIS — Z04.1 ENCOUNTER FOR EXAMINATION AND OBSERVATION FOLLOWING TRANSPORT ACCIDENT: ICD-10-CM

## 2025-07-15 PROCEDURE — 99053 MED SERV 10PM-8AM 24 HR FAC: CPT

## 2025-07-15 PROCEDURE — 99283 EMERGENCY DEPT VISIT LOW MDM: CPT
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
## 2025-07-16 VITALS
TEMPERATURE: 97 F | SYSTOLIC BLOOD PRESSURE: 122 MMHG | HEART RATE: 76 BPM | RESPIRATION RATE: 18 BRPM | OXYGEN SATURATION: 100 % | DIASTOLIC BLOOD PRESSURE: 779 MMHG

## 2025-07-16 RX ORDER — ACETAMINOPHEN 500 MG/5ML
975 LIQUID (ML) ORAL ONCE
Refills: 0 | Status: COMPLETED | OUTPATIENT
Start: 2025-07-16 | End: 2025-07-16

## 2025-07-16 RX ADMIN — Medication 975 MILLIGRAM(S): at 00:54

## 2025-07-16 NOTE — ED PROVIDER NOTE - PATIENT PORTAL LINK FT
You can access the FollowMyHealth Patient Portal offered by [facility] by registering at the following website: [email or web address]. By joining [employer]’s FollowMyHealth portal, you will also be able to view your health information using other applications (apps) compatible with our system.

## 2025-07-16 NOTE — ED PROVIDER NOTE - NSICDXPASTMEDICALHX_GEN_ALL_CORE_FT
PAST MEDICAL HISTORY:  Diabetes     Disorders of lysine and hydroxylysine metabolism     HLD (hyperlipidemia)     Hypothyroid
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
## 2025-07-16 NOTE — ED ADULT NURSE NOTE - CHPI ED NUR SYMPTOMS NEG
no acting out behaviors/no back pain/no bruising/no crying/no difficulty bearing weight/no disorientation/no dizziness/no loss of consciousness
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
## 2025-07-16 NOTE — ED PROVIDER NOTE - NSICDXPASTSURGICALHX_GEN_ALL_CORE_FT
PAST SURGICAL HISTORY:  H/O neck surgery removal of sebaceous cyst     H/O:      S/P abdominoplasty
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
## 2025-07-16 NOTE — ED ADULT NURSE NOTE - IN ACCORDANCE WITH [place] LAW, WE OFFER EVERY PATIENT A HEPATITIS C TEST. WOULD YOU LIKE TO BE TESTED TODAY?
Addended by: ADITHYA LUTZ on: 2/14/2020 02:06 PM     Modules accepted: Orders    
Addended by: SAMMY BECERRA on: 2/14/2020 02:04 PM     Modules accepted: Orders    
Addended by: SAMMY BECERRA on: 2/14/2020 10:33 AM     Modules accepted: Orders    
Opt out
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
